# Patient Record
Sex: MALE | Race: WHITE
[De-identification: names, ages, dates, MRNs, and addresses within clinical notes are randomized per-mention and may not be internally consistent; named-entity substitution may affect disease eponyms.]

---

## 2018-07-16 ENCOUNTER — HOSPITAL ENCOUNTER (OUTPATIENT)
Dept: HOSPITAL 50 - VM.SDS | Age: 58
Discharge: HOME | End: 2018-07-16
Attending: SURGERY
Payer: MEDICAID

## 2018-07-16 DIAGNOSIS — Z79.82: ICD-10-CM

## 2018-07-16 DIAGNOSIS — Z12.11: Primary | ICD-10-CM

## 2018-07-16 DIAGNOSIS — Z79.899: ICD-10-CM

## 2018-07-17 NOTE — OR
PREOPERATIVE DIAGNOSIS:  Screening colonoscopy.

 

POSTOPERATIVE DIAGNOSIS:  Screening colonoscopy.

 

PROCEDURE PERFORMED:  Screening colonoscopy.

 

INDICATION:  The patient is a 57-year-old male who presents for his first

screening colonoscopy at this time.

 

PROCEDURE IN DETAIL:  Procedure was done in the procedure room.  Sedation was

given per Anesthesia.  He was placed in left lateral position.  First, a rectal

exam was done, it was normal.  Scope was introduced in the rectum and slowly

advanced to the rectum, sigmoid, descending, transverse and ascending colon

until the cecum was reached.  Upon reaching the cecum, scope was then withdrawn

with no mucosal surface on the way out.  No mucosal abnormalities, lesions or

polyps were noted.

 

FINAL DIAGNOSIS:  Normal colonoscopy.

 

 

BKD:  07/16/2018 12:45:31  MODL:  07/16/2018 16:14:43

Job #:  [id number]